# Patient Record
(demographics unavailable — no encounter records)

---

## 2024-10-15 NOTE — PROCEDURE
[FreeTextEntry3] : Date of Service: 10/15/2024     Account: [id number]    Patient: MARIANA VALENTINO     YOB: 1983    Age: 41 year    Surgeon:  Patel Bansal DO    Assistant: None    Pre-Operative Diagnosis:  Lumbar Radiculitis (M54.16)    Post Operative Diagnosis: Lumbarl Radiculitis (M54.16)    Procedure:             Right L4-5, L5-S1 transforaminal epidural steroid injection under fluoroscopic guidance.    Anesthesia:           None  Procedure Detail: Patient was seen in the preoperative area and a detailed discussion about the risks (including potential neurovascular injury leading to paralysis and the differences between particulate and non-particulate steroid medications), benefits and alternatives was carried out.  All related questions were satisfactorily answered and an informed consent was signed.  Procedure site was marked and patient was taken to the fluoroscopy suite and placed in the prone position on the fluoroscopic table.  A spine positioning device was utilized for adequate position.  Monitors were applied and vital signs were recorded.  Anesthesia was carried out as mentioned above. A timeout was performed with all essential staff present and the site and side were verified.   The respective interspaces were located using fluoroscopic guidance.  The fluoroscopy beam was adjusted until the respective endplates were sharply aligned.  Oblique fluoroscopy projection was utilized so that the L4-5 and L5-S1 foramens were visualized.  The target was the area was just inferior to the pedicle of the respective levels mentioned above (safe triangle). The area was prepped with Chloraprep in a circumferential manner and draped in sterile aseptic fashion.  The skin overlying the target point was infiltrated with 3-4 ml of 1% lidocaine using a 25 guage needle.   A 22-gauge spinal needle was placed inferior to the right L4 pedicle till it contacted os. The needle was slowly walked off under lateral fluoroscopy to position the needle tip in the posterior portion of the foramen. There was no cerebrospinal fluid or blood on aspiration.  Then, 0.5-1 ml of Omnipaque 240 mg/ml contrast agent was injected with visualization of nerve root sleeve on AP fluoroscopic view, and retrograde epidural flow.  At this point, a solution containing 1 ml of 0.9% preservative free normal saline with 7.5mg of dexamethasone was injected onto the nerve root sleeve and into the epidural space.   A 22-gauge spinal needle was placed inferior to the right L5 pedicle till it contacted os. The needle was slowly walked off under lateral fluoroscopy to position the needle tip in the posterior portion of the foramen. There was no cerebrospinal fluid or blood on aspiration.  Then, 0.5-1 ml of Omnipaque 240 mg/ml contrast agent was injected with visualization of nerve root sleeve on AP fluoroscopic view, and retrograde epidural flow.  At this point, a solution containing 1 ml of 0.9% preservative free normal saline with 7.5mg of dexamethasone was injected onto the nerve root sleeve and into the epidural space. Patient tolerated the procedure well.   There were no immediate complications from the performed procedure. Band aid(s) were applied to the injection site(s). The patient was instructed to apply ice over the injection sites for twenty minutes every two hours for the next 24 hours.    Disposition:         1. The patient was advised to F/U in 1-2 weeks to assess the response to the injection.       2. The patient was also instructed to contact me immediately if there were any concerns related to the procedure performed.

## 2024-10-30 NOTE — DATA REVIEWED
[FreeTextEntry1] :  MRI images personally reviewed and reviewed with patient. Lumbar MRI ZP 09/2024: 1. Mild disc bulge and facet arthropathy L4-5. 2. Small central disc herniation L5-S1. 3. Lumbarized S1

## 2024-10-30 NOTE — ASSESSMENT
[FreeTextEntry1] : 41 year M with PMHx of CIRILO, Gastritis presents with pain in the right lower back with radiation into the right buttock  Patient with lumbar radiculitis, can repeat TFESI if pain recrudesces. Patient pleased with his results.  A discussion regarding available pain management treatment options occurred with the patient.  These included interventional, rehabilitative, pharmacological, and alternative modalities. We will proceed with the following:   Interventional treatment options: -None at this time - see additional instructions below      Rehabilitative options: - participation in active HEP was discussed and instructions provided   Medication based treatment options: - continue flexeril PRN seldom   Complementary treatment options: - Weight management and lifestyle modifications discussed    Additional treatment recommendations as follows: - patient to follow up with Dr. Das as directed

## 2024-10-30 NOTE — PHYSICAL EXAM
[de-identified] : General: Appears well nourished and well developed, no acute distress Musculoskeletal: Gait is non-antalgic, patient is ambulating without assistance Lumbar Spine Exam:                        Inspection:    erythema (-)   ecchymosis (-)   rashes (-)                          Palpation: Palpation/percussion at the midline lumbar levels reveals no tenderness                       ROM:    ROM - full range of motion with mild stiffness                            Strength Testin/5 in the bilateral lower extremities                      Reflexes: 2/2 in the bilateral lower extremities                      Sensation: Sensation to light touch grossly intact in the bilateral lower extremities                      Special Tests:  SLR: R (-) ; L (-), Facet loading: R (-) ; L (-)

## 2024-10-30 NOTE — HISTORY OF PRESENT ILLNESS
[Lower back] : lower back [1] : 2 [0] : 0 [Dull/Aching] : dull/aching [Radiating] : radiating [Occasional] : occasional [Rest] : rest [Standing] : standing [Exercising] : exercising [Full time] : Work status: full time [FreeTextEntry1] : 41 year M with PMHx of CIRILO, Gastritis presents for follow up regarding their low back pain.   Patient was last seen for TFESI, which provided about 90% relief. No longer taking meds.  Current treatment: HEP   Location: Right lower back into the top of the right buttock Quality: Twinge Numbness/tingling: Denies Weakness: Denies   Bowel/bladder dysfunction: Denies   Prior injections: R L4-5, L5-S1 TFESI 10/15/24 - 90%   Prior Treatments: PT, MDP, NSAIDs, Heat, TENs Pertinent Surgical History:   Anticoagulation: Denies     Patient denies current infection, fevers, or chills. Physician Disclaimer: I have personally reviewed and confirmed all HPI data with the patient. [] : no [de-identified] : running [de-identified] : 10/15/2024 [de-identified] : Dr. 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

## 2024-10-30 NOTE — PHYSICAL EXAM
[de-identified] : General: Appears well nourished and well developed, no acute distress Musculoskeletal: Gait is non-antalgic, patient is ambulating without assistance Lumbar Spine Exam:                        Inspection:    erythema (-)   ecchymosis (-)   rashes (-)                          Palpation: Palpation/percussion at the midline lumbar levels reveals no tenderness                       ROM:    ROM - full range of motion with mild stiffness                            Strength Testin/5 in the bilateral lower extremities                      Reflexes: 2/2 in the bilateral lower extremities                      Sensation: Sensation to light touch grossly intact in the bilateral lower extremities                      Special Tests:  SLR: R (-) ; L (-), Facet loading: R (-) ; L (-)

## 2024-10-30 NOTE — HISTORY OF PRESENT ILLNESS
[Lower back] : lower back [1] : 2 [0] : 0 [Dull/Aching] : dull/aching [Radiating] : radiating [Occasional] : occasional [Rest] : rest [Standing] : standing [Exercising] : exercising [Full time] : Work status: full time [FreeTextEntry1] : 41 year M with PMHx of CIRILO, Gastritis presents for follow up regarding their low back pain.   Patient was last seen for TFESI, which provided about 90% relief. No longer taking meds.  Current treatment: HEP   Location: Right lower back into the top of the right buttock Quality: Twinge Numbness/tingling: Denies Weakness: Denies   Bowel/bladder dysfunction: Denies   Prior injections: R L4-5, L5-S1 TFESI 10/15/24 - 90%   Prior Treatments: PT, MDP, NSAIDs, Heat, TENs Pertinent Surgical History:   Anticoagulation: Denies     Patient denies current infection, fevers, or chills. Physician Disclaimer: I have personally reviewed and confirmed all HPI data with the patient. [] : no [de-identified] : running [de-identified] : 10/15/2024 [de-identified] : Dr. 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

## 2024-12-02 NOTE — HISTORY OF PRESENT ILLNESS
[Lower back] : lower back [Dull/Aching] : dull/aching [Localized] : localized [Sharp] : sharp [Constant] : constant [] : yes

## 2024-12-27 NOTE — PHYSICAL EXAM
[de-identified] : General: Appears well nourished and well developed, no acute distress Musculoskeletal: Gait is non-antalgic, patient is ambulating without assistance Lumbar Spine Exam:                        Inspection:    erythema (-)   ecchymosis (-)   rashes (-)                          Palpation: Palpation/percussion at the midline lumbar levels reveals no tenderness                       ROM:    ROM - full range of motion with mild stiffness                            Strength Testin/5 in the bilateral lower extremities                      Reflexes: 2/2 in the bilateral lower extremities                      Sensation: Sensation to light touch grossly intact in the bilateral lower extremities                      Special Tests:  SLR: R (-) ; L (-), Facet loading: R (-) ; L (-)

## 2024-12-27 NOTE — ASSESSMENT
[FreeTextEntry1] : 41 year M with PMHx of CIRILO, Gastritis presents with pain in the right lower back with radiation into the right buttock  Patient with lumbar radiculitis, can repeat TFESI if pain recrudesces. Continue conservative therapy at this time.  A discussion regarding available pain management treatment options occurred with the patient.  These included interventional, rehabilitative, pharmacological, and alternative modalities. We will proceed with the following:   Interventional treatment options: -None at this time - see additional instructions below      Rehabilitative options: - participation in active HEP was discussed and instructions provided   Medication based treatment options: - continue flexeril PRN seldom   Complementary treatment options: - Weight management and lifestyle modifications discussed    Additional treatment recommendations as follows: - patient to follow up with Dr. Das as directed - RTC in 10 weeks

## 2024-12-27 NOTE — PHYSICAL EXAM
[de-identified] : General: Appears well nourished and well developed, no acute distress Musculoskeletal: Gait is non-antalgic, patient is ambulating without assistance Lumbar Spine Exam:                        Inspection:    erythema (-)   ecchymosis (-)   rashes (-)                          Palpation: Palpation/percussion at the midline lumbar levels reveals no tenderness                       ROM:    ROM - full range of motion with mild stiffness                            Strength Testin/5 in the bilateral lower extremities                      Reflexes: 2/2 in the bilateral lower extremities                      Sensation: Sensation to light touch grossly intact in the bilateral lower extremities                      Special Tests:  SLR: R (-) ; L (-), Facet loading: R (-) ; L (-)

## 2024-12-27 NOTE — PHYSICAL EXAM
[de-identified] : General: Appears well nourished and well developed, no acute distress Musculoskeletal: Gait is non-antalgic, patient is ambulating without assistance Lumbar Spine Exam:                        Inspection:    erythema (-)   ecchymosis (-)   rashes (-)                          Palpation: Palpation/percussion at the midline lumbar levels reveals no tenderness                       ROM:    ROM - full range of motion with mild stiffness                            Strength Testin/5 in the bilateral lower extremities                      Reflexes: 2/2 in the bilateral lower extremities                      Sensation: Sensation to light touch grossly intact in the bilateral lower extremities                      Special Tests:  SLR: R (-) ; L (-), Facet loading: R (-) ; L (-)

## 2024-12-27 NOTE — HISTORY OF PRESENT ILLNESS
[Lower back] : lower back [1] : 2 [0] : 0 [Dull/Aching] : dull/aching [Radiating] : radiating [Occasional] : occasional [Rest] : rest [Standing] : standing [Exercising] : exercising [Full time] : Work status: full time [2] : 2 [Heat] : heat [FreeTextEntry1] : 41 year M with PMHx of CIRILO, Gastritis presents for follow up regarding their low back pain.   Patient was last seen 10/30, was doing well after TFESI. Continues to do well. Is able to exercise.  Current treatment: HEP   Location: Bilateral lumbar Quality: Stiff Numbness/tingling: Denies Weakness: Denies   Bowel/bladder dysfunction: Denies   Prior injections: R L4-5, L5-S1 TFESI 10/15/24 - 90%   Prior Treatments: PT, MDP, NSAIDs, Heat, TENs Pertinent Surgical History:   Anticoagulation: Denies     Patient denies current infection, fevers, or chills. Physician Disclaimer: I have personally reviewed and confirmed all HPI data with the patient. [] : no [de-identified] : 10/15/2024 [de-identified] : running [de-identified] : Dr. 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

## 2024-12-27 NOTE — HISTORY OF PRESENT ILLNESS
[Lower back] : lower back [1] : 2 [0] : 0 [Dull/Aching] : dull/aching [Radiating] : radiating [Occasional] : occasional [Rest] : rest [Standing] : standing [Exercising] : exercising [Full time] : Work status: full time [2] : 2 [Heat] : heat [FreeTextEntry1] : 41 year M with PMHx of CIRILO, Gastritis presents for follow up regarding their low back pain.   Patient was last seen 10/30, was doing well after TFESI. Continues to do well. Is able to exercise.  Current treatment: HEP   Location: Bilateral lumbar Quality: Stiff Numbness/tingling: Denies Weakness: Denies   Bowel/bladder dysfunction: Denies   Prior injections: R L4-5, L5-S1 TFESI 10/15/24 - 90%   Prior Treatments: PT, MDP, NSAIDs, Heat, TENs Pertinent Surgical History:   Anticoagulation: Denies     Patient denies current infection, fevers, or chills. Physician Disclaimer: I have personally reviewed and confirmed all HPI data with the patient. [] : no [de-identified] : running [de-identified] : 10/15/2024 [de-identified] : Dr. 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

## 2024-12-27 NOTE — HISTORY OF PRESENT ILLNESS
[Lower back] : lower back [1] : 2 [0] : 0 [Dull/Aching] : dull/aching [Radiating] : radiating [Occasional] : occasional [Rest] : rest [Standing] : standing [Exercising] : exercising [Full time] : Work status: full time [2] : 2 [Heat] : heat [FreeTextEntry1] : 41 year M with PMHx of CIRILO, Gastritis presents for follow up regarding their low back pain.   Patient was last seen 10/30, was doing well after TFESI. Continues to do well. Is able to exercise.  Current treatment: HEP   Location: Bilateral lumbar Quality: Stiff Numbness/tingling: Denies Weakness: Denies   Bowel/bladder dysfunction: Denies   Prior injections: R L4-5, L5-S1 TFESI 10/15/24 - 90%   Prior Treatments: PT, MDP, NSAIDs, Heat, TENs Pertinent Surgical History:   Anticoagulation: Denies     Patient denies current infection, fevers, or chills. Physician Disclaimer: I have personally reviewed and confirmed all HPI data with the patient. [] : no [de-identified] : running [de-identified] : 10/15/2024 [de-identified] : Dr. 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

## 2025-03-10 NOTE — PHYSICAL EXAM
[de-identified] : General: Appears well nourished and well developed, no acute distress Musculoskeletal: Gait is non-antalgic, patient is ambulating without assistance Lumbar Spine Exam:                        Inspection:    erythema (-)   ecchymosis (-)   rashes (-)                          Palpation: Palpation/percussion at the midline lumbar levels reveals no tenderness                       ROM:    ROM - full range of motion with mild stiffness                            Strength Testin/5 in the bilateral lower extremities                      Reflexes: 2/2 in the bilateral lower extremities                      Sensation: Sensation to light touch grossly intact in the bilateral lower extremities                      Special Tests:  SLR: R (-) ; L (-), Facet loading: R (-) ; L (-)

## 2025-03-10 NOTE — HISTORY OF PRESENT ILLNESS
[Lower back] : lower back [2] : 2 [0] : 0 [Dull/Aching] : dull/aching [Radiating] : radiating [Occasional] : occasional [Rest] : rest [Heat] : heat [Standing] : standing [Exercising] : exercising [Full time] : Work status: full time [4] : 4 [Leisure] : leisure [FreeTextEntry1] : 41 year M with PMHx of CIRILO, Gastritis presents for follow up regarding their low back pain.  Patient was last seen 12/27, was doing well. Back has been doing pretty good.  Current treatment: HEP   Location: Bilateral lumbar Quality: Stiff Numbness/tingling: Denies Weakness: Denies   Bowel/bladder dysfunction: Denies   Prior injections: R L4-5, L5-S1 TFESI 10/15/24 - 90%   Prior Treatments: PT, MDP, NSAIDs, Heat, TENs Pertinent Surgical History:   Anticoagulation: Denies     Patient denies current infection, fevers, or chills. Physician Disclaimer: I have personally reviewed and confirmed all HPI data with the patient. [] : no [FreeTextEntry7] : right leg sometimes  [de-identified] : running [de-identified] : 10/15/2024 [de-identified] : Dr. 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

## 2025-03-10 NOTE — PHYSICAL EXAM
[de-identified] : General: Appears well nourished and well developed, no acute distress Musculoskeletal: Gait is non-antalgic, patient is ambulating without assistance Lumbar Spine Exam:                        Inspection:    erythema (-)   ecchymosis (-)   rashes (-)                          Palpation: Palpation/percussion at the midline lumbar levels reveals no tenderness                       ROM:    ROM - full range of motion with mild stiffness                            Strength Testin/5 in the bilateral lower extremities                      Reflexes: 2/2 in the bilateral lower extremities                      Sensation: Sensation to light touch grossly intact in the bilateral lower extremities                      Special Tests:  SLR: R (-) ; L (-), Facet loading: R (-) ; L (-)

## 2025-03-10 NOTE — HISTORY OF PRESENT ILLNESS
[Lower back] : lower back [2] : 2 [0] : 0 [Dull/Aching] : dull/aching [Radiating] : radiating [Occasional] : occasional [Rest] : rest [Heat] : heat [Standing] : standing [Exercising] : exercising [Full time] : Work status: full time [4] : 4 [Leisure] : leisure [FreeTextEntry1] : 41 year M with PMHx of CIRILO, Gastritis presents for follow up regarding their low back pain.  Patient was last seen 12/27, was doing well. Back has been doing pretty good.  Current treatment: HEP   Location: Bilateral lumbar Quality: Stiff Numbness/tingling: Denies Weakness: Denies   Bowel/bladder dysfunction: Denies   Prior injections: R L4-5, L5-S1 TFESI 10/15/24 - 90%   Prior Treatments: PT, MDP, NSAIDs, Heat, TENs Pertinent Surgical History:   Anticoagulation: Denies     Patient denies current infection, fevers, or chills. Physician Disclaimer: I have personally reviewed and confirmed all HPI data with the patient. [] : no [FreeTextEntry7] : right leg sometimes  [de-identified] : running [de-identified] : 10/15/2024 [de-identified] : Dr. 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

## 2025-05-19 NOTE — PHYSICAL EXAM
[de-identified] : General: Appears well nourished and well developed, no acute distress Musculoskeletal: Gait is non-antalgic, patient is ambulating without assistance Lumbar Spine Exam:                        Inspection:    erythema (-)   ecchymosis (-)   rashes (-)                          Palpation: Palpation/percussion at the midline lumbar levels reveals no tenderness                       ROM:    ROM - full range of motion with mild stiffness                            Strength Testin/5 in the bilateral lower extremities                      Reflexes: 2/2 in the bilateral lower extremities                      Sensation: Sensation to light touch grossly intact in the bilateral lower extremities                      Special Tests:  SLR: R (-) ; L (-), Facet loading: R (-) ; L (-)

## 2025-05-19 NOTE — ASSESSMENT
[FreeTextEntry1] : 41 year M with PMHx of CIRILO, Gastritis presents with pain in the right lower back with radiation into the right buttock  Patient with lumbar radiculitis, can repeat TFESI if pain recrudesces. Continue conservative therapy at this time.  A discussion regarding available pain management treatment options occurred with the patient.  These included interventional, rehabilitative, pharmacological, and alternative modalities. We will proceed with the following:   Interventional treatment options: -None at this time - see additional instructions below      Rehabilitative options: - participation in active HEP was discussed and instructions provided   Medication based treatment options: - continue flexeril PRN seldom   Complementary treatment options: - Weight management and lifestyle modifications discussed    Additional treatment recommendations as follows: - patient to follow up with Dr. Das as directed - RTC PRN

## 2025-05-19 NOTE — HISTORY OF PRESENT ILLNESS
[Lower back] : lower back [Dull/Aching] : dull/aching [Radiating] : radiating [Occasional] : occasional [Leisure] : leisure [Rest] : rest [Heat] : heat [Full time] : Work status: full time [2] : 2 [Sitting] : sitting [FreeTextEntry1] : 41 year M with PMHx of CIRILO, Gastritis presents for follow up regarding their low back pain. pt claims everything has been better since last visit   Patient was last seen 3/10, was doing well. Continues to do well. Has been exercising.  Current treatment: HEP   Location: Bilateral lumbar spine Quality: Stiff Numbness/tingling: Denies Weakness: Denies   Bowel/bladder dysfunction: Denies   Prior injections: R L4-5, L5-S1 TFESI 10/15/24 - 90%   Prior Treatments: PT, MDP, NSAIDs, Heat, TENs Pertinent Surgical History:   Anticoagulation: Denies     Patient denies current infection, fevers, or chills. Physician Disclaimer: I have personally reviewed and confirmed all HPI data with the patient. [] : no [de-identified] : running [de-identified] : 10/15/2024 [de-identified] : Dr. 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

## 2025-07-09 NOTE — PHYSICAL EXAM
[Left] : left shoulder [Sitting] : sitting [Mild] : mild [5 ___] : forward flexion 5[unfilled]/5 [5___] : external rotation 5[unfilled]/5 [] : no sensory deficits [FreeTextEntry9] : IR to T8. [TWNoteComboBox6] : internal rotation L1 [TWNoteComboBox4] : passive forward flexion 160 degrees [de-identified] : external rotation 60 degrees

## 2025-07-09 NOTE — DATA REVIEWED
[FreeTextEntry1] : SHOULDER XR LEFT (AP/Outlet) taken and reviewed on 7/9/25: The clinically significant findings include no GH changes.  The AC is OK.  SCAPULA XR LEFT (Axillary/[place]) taken and reviewed on 7/9/25: The clinically significant findings include a Type II acromion.

## 2025-07-09 NOTE — HISTORY OF PRESENT ILLNESS
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

## 2025-07-09 NOTE — REASON FOR VISIT
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  No prior history.  Sleeping can be sore.  No numbness.  Reaching is uncomfortable.  He had sinus surgery in January, resulting in on and off use of antibiotics and steroids.  He tries to avoid medications now, when he can, tho had a scope for low hematocrit.

## 2025-07-09 NOTE — ASSESSMENT
[FreeTextEntry1] : _____ We discussed the underlying pathology. This is acute uncomplicated. Treatment options reviewed. A Medrol dose pack is prescribed, dispense 1, to be taken as directed, with risks of GI symptoms reviewed. Physical Therapy is prescribed, 2x/week for 4-6 weeks. Caution with activities, including bathroom renovation discussed. If sx persist, an injection vs MRI considered. Cautions discussed. Questions answered.  Patient seen by Dr. Yobany Tamayo, who determined the assessment and plan. Angelica PAVON participated in the care of the patient, including the history and physical exam.